# Patient Record
Sex: FEMALE | Race: WHITE | NOT HISPANIC OR LATINO | Employment: OTHER | URBAN - METROPOLITAN AREA
[De-identification: names, ages, dates, MRNs, and addresses within clinical notes are randomized per-mention and may not be internally consistent; named-entity substitution may affect disease eponyms.]

---

## 2017-10-16 ENCOUNTER — HOSPITAL ENCOUNTER (OUTPATIENT)
Dept: RADIOLOGY | Facility: HOSPITAL | Age: 67
Discharge: HOME | End: 2017-10-16
Attending: NURSE PRACTITIONER
Payer: MEDICARE

## 2017-10-16 ENCOUNTER — OFFICE VISIT (OUTPATIENT)
Dept: FAMILY MEDICINE | Facility: CLINIC | Age: 67
End: 2017-10-16
Payer: MEDICARE

## 2017-10-16 VITALS
TEMPERATURE: 100 F | HEART RATE: 84 BPM | DIASTOLIC BLOOD PRESSURE: 80 MMHG | HEIGHT: 68 IN | WEIGHT: 144.63 LBS | BODY MASS INDEX: 21.92 KG/M2 | SYSTOLIC BLOOD PRESSURE: 132 MMHG | OXYGEN SATURATION: 97 %

## 2017-10-16 DIAGNOSIS — R05.9 COUGH: ICD-10-CM

## 2017-10-16 DIAGNOSIS — R68.89 FLU-LIKE SYMPTOMS: Primary | ICD-10-CM

## 2017-10-16 LAB
FLUAV AG SPEC QL IA: NEGATIVE
FLUBV AG SPEC QL IA: NEGATIVE
SPECIMEN SOURCE: NORMAL

## 2017-10-16 PROCEDURE — 99999 PR PBB SHADOW E&M-EST. PATIENT-LVL III: CPT | Mod: PBBFAC,,, | Performed by: NURSE PRACTITIONER

## 2017-10-16 PROCEDURE — 87400 INFLUENZA A/B EACH AG IA: CPT | Mod: 59,PO

## 2017-10-16 PROCEDURE — 99213 OFFICE O/P EST LOW 20 MIN: CPT | Mod: PBBFAC,PO | Performed by: NURSE PRACTITIONER

## 2017-10-16 PROCEDURE — 99213 OFFICE O/P EST LOW 20 MIN: CPT | Mod: S$PBB,,, | Performed by: NURSE PRACTITIONER

## 2017-10-16 PROCEDURE — 71020 XR CHEST PA AND LATERAL: CPT | Mod: 26,,, | Performed by: RADIOLOGY

## 2017-10-16 PROCEDURE — 71020 XR CHEST PA AND LATERAL: CPT | Mod: TC,PO

## 2017-10-16 RX ORDER — KETOROLAC TROMETHAMINE 10 MG/1
TABLET, FILM COATED ORAL
COMMUNITY
Start: 2017-10-10

## 2017-10-16 RX ORDER — METHYLPREDNISOLONE 4 MG/1
TABLET ORAL
Qty: 1 PACKAGE | Refills: 0 | Status: SHIPPED | OUTPATIENT
Start: 2017-10-16 | End: 2017-11-06

## 2017-10-16 RX ORDER — LEVOCETIRIZINE DIHYDROCHLORIDE 5 MG/1
TABLET, FILM COATED ORAL
COMMUNITY
Start: 2017-07-19

## 2017-10-16 RX ORDER — GUAIFENESIN 1200 MG/1
1 TABLET, EXTENDED RELEASE ORAL 2 TIMES DAILY
Qty: 20 TABLET | Refills: 0 | COMMUNITY
Start: 2017-10-16

## 2017-10-16 RX ORDER — DOXYCYCLINE HYCLATE 100 MG
100 TABLET ORAL 2 TIMES DAILY
Qty: 20 TABLET | Refills: 0 | Status: SHIPPED | OUTPATIENT
Start: 2017-10-16

## 2017-10-16 RX ORDER — ALBUTEROL SULFATE 90 UG/1
2 AEROSOL, METERED RESPIRATORY (INHALATION) EVERY 6 HOURS PRN
Qty: 18 G | Refills: 0 | Status: SHIPPED | OUTPATIENT
Start: 2017-10-16

## 2017-10-16 NOTE — PROGRESS NOTES
Subjective:       Patient ID: Barbra Chapman is a 66 y.o. female.    Chief Complaint: Cough; Chest Congestion; and Rash (arms)    2 weeks ago patient had sore throat, high fever, chest congestion, and cough. Lasted about 7 days. Started to improve. Sore throat and body aches and fever improved, cough never resolved. Patient has severe headaches about a week ago, took increase dose of tylenol, headaches resolved about 2 days later. Has had continued cough over the last week, no fever or other symptoms.4 days ago patient noticed an itching rash 4 days ago on both forearms. She has used hydrocortisone cream. No rash anywhere else. Yesterday patient ran fever 100.6, fatigue and weakness. She has been taking coricidin, flonase, and xyzal.     Hepatitis C Screening due on 1950  TETANUS VACCINE due on 12/16/1967  Mammogram due on 12/16/1989  DEXA SCAN due on 12/16/1989  Colonoscopy due on 12/16/1999  Zoster Vaccine due on 12/16/2009  Pneumococcal (65+)(1 of 2 - PCV13) due on 12/16/2014  Influenza Vaccine due on 08/01/2017    Past Medical History:  Past Medical History:  No date: Sinusitis  Past Surgical History:  No date: APPENDECTOMY  Review of patient's allergies indicates:   -- Thimerosal -- Other (See Comments)    --  Eyes were blood shot, vision decreased  No current outpatient prescriptions on file prior to visit.  No current facility-administered medications on file prior to visit.     Social History    Marital status: Single              Spouse name:                       Years of education:                 Number of children:               Occupational History    None on file    Social History Main Topics    Smoking status: Former Smoker                                                                Packs/day: 0.00      Years: 0.00        Smokeless tobacco: Never Used                        Alcohol use: No              Drug use: No              Sexual activity: Not on file          Other Topics             Concern    None on file    Social History Narrative    None on file      Review of patient's family history indicates:    Heart disease                  Mother                    Diabetes                       Mother                    Stroke                         Father                            Review of Systems   Constitutional: Positive for chills, diaphoresis, fatigue and fever.   HENT: Negative.    Respiratory: Positive for cough and chest tightness. Negative for shortness of breath and wheezing.    Cardiovascular: Negative.  Negative for chest pain, palpitations and leg swelling.   Gastrointestinal: Negative.  Negative for abdominal pain, constipation, diarrhea and vomiting.   Musculoskeletal: Positive for myalgias.   Skin: Positive for rash.   Neurological: Positive for headaches. Negative for dizziness and light-headedness.       Objective:      Physical Exam   Constitutional: She is oriented to person, place, and time. No distress.   HENT:   Head: Normocephalic and atraumatic.   Eyes: Pupils are equal, round, and reactive to light.   Cardiovascular: Normal rate and regular rhythm.  Exam reveals no friction rub.    No murmur heard.  Pulmonary/Chest: She has wheezes. She has rhonchi.   Abdominal: Soft. Bowel sounds are normal. She exhibits no distension. There is no tenderness.   Musculoskeletal: She exhibits no edema.   Neurological: She is alert and oriented to person, place, and time.   Skin: Rash (macular rash both forearms) noted. She is not diaphoretic.   Psychiatric: She has a normal mood and affect. Her behavior is normal.   Vitals reviewed.      Assessment:       1. Flu-like symptoms    2. Cough        Plan:       1. Flu-like symptoms    - Influenza antigen Nasal Swab    2. Cough  Follow up if not resolving. Rest and increase fluids.   - X-Ray Chest PA And Lateral; Future  - methylPREDNISolone (MEDROL DOSEPACK) 4 mg tablet; use as directed  Dispense: 1 Package; Refill: 0  - doxycycline  (VIBRA-TABS) 100 MG tablet; Take 1 tablet (100 mg total) by mouth 2 (two) times daily.  Dispense: 20 tablet; Refill: 0  - albuterol 90 mcg/actuation inhaler; Inhale 2 puffs into the lungs every 6 (six) hours as needed.  Dispense: 18 g; Refill: 0  - guaifenesin (MUCINEX) 1,200 mg Ta12; Take 1 tablet by mouth 2 (two) times daily.  Dispense: 20 tablet; Refill: 0

## 2017-10-18 ENCOUNTER — TELEPHONE (OUTPATIENT)
Dept: FAMILY MEDICINE | Facility: CLINIC | Age: 67
End: 2017-10-18

## 2017-10-18 DIAGNOSIS — J84.10 PULMONARY FIBROSIS: Primary | ICD-10-CM

## 2017-10-18 NOTE — TELEPHONE ENCOUNTER
----- Message from Bria Durham sent at 10/18/2017  8:44 AM CDT -----  Contact: self  Patient is requesting the results from her chest x-ray.  Please call [phone] (home).  Thank you!

## 2017-11-16 ENCOUNTER — HOSPITAL ENCOUNTER (OUTPATIENT)
Dept: RADIOLOGY | Facility: HOSPITAL | Age: 67
Discharge: HOME | End: 2017-11-16
Attending: NURSE PRACTITIONER
Payer: MEDICARE

## 2017-11-16 DIAGNOSIS — J84.10 PULMONARY FIBROSIS: ICD-10-CM

## 2017-11-16 DIAGNOSIS — J84.10 PULMONARY FIBROSIS: Primary | ICD-10-CM

## 2017-11-16 PROCEDURE — 71020 XR CHEST PA AND LATERAL: CPT | Mod: 26,,, | Performed by: RADIOLOGY

## 2017-11-16 PROCEDURE — 71020 XR CHEST PA AND LATERAL: CPT | Mod: TC,PO

## 2019-09-21 ENCOUNTER — OFFICE VISIT (OUTPATIENT)
Dept: URGENT CARE | Facility: CLINIC | Age: 69
End: 2019-09-21
Payer: MEDICARE

## 2019-09-21 VITALS
HEIGHT: 68 IN | OXYGEN SATURATION: 99 % | DIASTOLIC BLOOD PRESSURE: 85 MMHG | SYSTOLIC BLOOD PRESSURE: 148 MMHG | BODY MASS INDEX: 21.82 KG/M2 | HEART RATE: 72 BPM | RESPIRATION RATE: 16 BRPM | TEMPERATURE: 98 F | WEIGHT: 144 LBS

## 2019-09-21 DIAGNOSIS — S62.396A OTHER FRACTURE OF FIFTH METACARPAL BONE, RIGHT HAND, INITIAL ENCOUNTER FOR CLOSED FRACTURE: Primary | ICD-10-CM

## 2019-09-21 DIAGNOSIS — S69.91XA INJURY OF RIGHT HAND, INITIAL ENCOUNTER: ICD-10-CM

## 2019-09-21 DIAGNOSIS — T07.XXXA ABRASIONS OF MULTIPLE SITES: ICD-10-CM

## 2019-09-21 PROCEDURE — 99214 OFFICE O/P EST MOD 30 MIN: CPT | Mod: 25,S$GLB,, | Performed by: PHYSICIAN ASSISTANT

## 2019-09-21 PROCEDURE — 73130 X-RAY EXAM OF HAND: CPT | Mod: RT,S$GLB,, | Performed by: RADIOLOGY

## 2019-09-21 PROCEDURE — 99214 PR OFFICE/OUTPT VISIT, EST, LEVL IV, 30-39 MIN: ICD-10-PCS | Mod: 25,S$GLB,, | Performed by: PHYSICIAN ASSISTANT

## 2019-09-21 PROCEDURE — 29125 PR APPLY FOREARM SPLINT,STATIC: ICD-10-PCS | Mod: RT,S$GLB,, | Performed by: PHYSICIAN ASSISTANT

## 2019-09-21 PROCEDURE — 29125 APPL SHORT ARM SPLINT STATIC: CPT | Mod: RT,S$GLB,, | Performed by: PHYSICIAN ASSISTANT

## 2019-09-21 PROCEDURE — 73130 XR HAND COMPLETE 3 VIEW RIGHT: ICD-10-PCS | Mod: RT,S$GLB,, | Performed by: RADIOLOGY

## 2019-09-21 RX ORDER — MUPIROCIN 20 MG/G
OINTMENT TOPICAL 3 TIMES DAILY
Qty: 22 G | Refills: 1 | Status: SHIPPED | OUTPATIENT
Start: 2019-09-21 | End: 2019-10-01

## 2019-09-21 NOTE — PATIENT INSTRUCTIONS
Closed Hand Fracture (Adult)  You have a fracture, or broken bone, in your hand. This may be a small crack or chip in the bone. Or it may be a major break with the broken parts pushed out of place. A closed fracture means that the broken bone has not gone through the skin. A hand fracture is treated with a splint or cast. It usually takes 4 to 6 weeks to heal. Severe injuries may require surgery.     Home care  · Keep your arm elevated to reduce pain and swelling. When sitting or lying down, elevate your arm above the level of your heart. You can do this by placing your arm on a pillow that rests on your chest or on a pillow at your side. This is most important during the first 48 hours after injury.  · Apply an ice pack over the injured area for no more than 15 to 20 minutes. Do this every 1 to 2 hours for the first 24 to 48 hours. Continue with ice packs as needed to ease pain and swelling. To make an ice pack, put ice cubes in a plastic bag that seals at the top. Wrap the bag in a clean, thin towel or cloth. Never put ice or an ice pack directly on the skin. You can place the ice pack inside the sling and directly over the cast or splint. As the ice melts, be careful that the cast or splint doesnt get wet.  · Keep the cast or splint completely dry at all times. Bathe with your cast or splint out of the water, protected with 2 large plastic bags. Place 1 bag outside the other. Tape each bag with duct tape at the top end. If a fiberglass cast or splint gets wet, dry it with a hair dryer on a cool setting.  · You may use over-the-counter pain medicine to control pain, unless another pain medicine was prescribed. If you have chronic liver or kidney disease or ever had a stomach ulcer or GI bleeding, talk with your provider beforeusing these medicines.  Follow-up care  Follow up with your healthcare provider within 1 week, or as advised. This is to be sure the bone is healing properly. If you were given a splint,  it may be changed to a cast at your follow-up visit.  If X-rays were taken, you will be told of any new findings that may affect your care.  When to seek medical advice  Call your healthcare provider right away if any of these occur:  · The plaster cast or splint becomes wet or soft  · The fiberglass cast or splint stays wet for more than 24 hours  · The cast has a bad smell  · The plaster cast or splint becomes loose  · There is increased tightness or pain under the cast or splint  · The fingers on your injured hand become swollen, cold, blue, numb, or tingly  Date Last Reviewed: 12/3/2015  © 4426-4464 [employer]. [address] [place] [address], [place] [address]. All rights reserved. This information is not intended as a substitute for professional medical care. Always follow your healthcare professional's instructions.
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
## 2019-09-23 ENCOUNTER — OFFICE VISIT (OUTPATIENT)
Dept: ORTHOPEDICS | Facility: CLINIC | Age: 69
End: 2019-09-23
Payer: MEDICARE

## 2019-09-23 VITALS — HEIGHT: 68 IN | WEIGHT: 144 LBS | BODY MASS INDEX: 21.82 KG/M2

## 2019-09-23 DIAGNOSIS — S62.336A CLOSED DISPLACED FRACTURE OF NECK OF FIFTH METACARPAL BONE OF RIGHT HAND, INITIAL ENCOUNTER: Primary | ICD-10-CM

## 2019-09-23 DIAGNOSIS — S62.345A CLOSED NONDISPLACED FRACTURE OF BASE OF FOURTH METACARPAL BONE OF LEFT HAND, INITIAL ENCOUNTER: ICD-10-CM

## 2019-09-23 PROCEDURE — 99212 OFFICE O/P EST SF 10 MIN: CPT | Mod: PBBFAC,PN,25 | Performed by: ORTHOPAEDIC SURGERY

## 2019-09-23 PROCEDURE — 26600 PR CLOSED RX METACARPAL FX: ICD-10-PCS | Mod: S$PBB,RT,, | Performed by: ORTHOPAEDIC SURGERY

## 2019-09-23 PROCEDURE — 26600 TREAT METACARPAL FRACTURE: CPT | Mod: F8,PBBFAC,PN | Performed by: ORTHOPAEDIC SURGERY

## 2019-09-23 PROCEDURE — 99204 PR OFFICE/OUTPT VISIT, NEW, LEVL IV, 45-59 MIN: ICD-10-PCS | Mod: 57,S$PBB,, | Performed by: ORTHOPAEDIC SURGERY

## 2019-09-23 PROCEDURE — 99999 PR PBB SHADOW E&M-EST. PATIENT-LVL II: ICD-10-PCS | Mod: PBBFAC,,, | Performed by: ORTHOPAEDIC SURGERY

## 2019-09-23 PROCEDURE — 99204 OFFICE O/P NEW MOD 45 MIN: CPT | Mod: 57,S$PBB,, | Performed by: ORTHOPAEDIC SURGERY

## 2019-09-23 PROCEDURE — 99999 PR PBB SHADOW E&M-EST. PATIENT-LVL II: CPT | Mod: PBBFAC,,, | Performed by: ORTHOPAEDIC SURGERY

## 2019-09-23 PROCEDURE — 26600 TREAT METACARPAL FRACTURE: CPT | Mod: S$PBB,RT,, | Performed by: ORTHOPAEDIC SURGERY

## 2019-09-23 NOTE — PATIENT INSTRUCTIONS
Fiberglass Cast Care    It may take up to 2 hours for the fiberglass to get completely hard. Dont put any weight on the cast during that time or it may break.  To prevent swelling under the cast, do the following for the first 2 days (48 hours):  · If the cast is on your arm: Keep it in a sling or raised to shoulder level when you are sitting or standing. Rest it on your chest or on a pillow at your side when lying down. Keep the cast above the level of your heart.  · If the cast is on your leg: Keep it propped up above the level of your hip when you are sitting or lying down. Sleep with the cast raised on a pillow. Avoid crutch walking as much as possible during this time.  Keep the cast completely dry at all times. Bathe with your cast well out of the water. Protect it with a large plastic bag kept in place with rubber bands. If your cast does get wet, use a hair dryer to warm the cast and speed up the drying process. A wet cast may cause skin problems.  Dont put creams or objects under the cast if you have itching.  Follow-up care  Follow up with your healthcare provider, or as advised.  When to seek medical advice  Call your healthcare provider right away if any of these occur:  · The cast cracks  · The cast and padding get wet and stay wet for more than a day (24 hours)  · Bad odor from the cast or wound fluid stains the cast  · Tightness or pressure under the cast gets worse  · Fingers or toes become swollen, cold, blue, numb, or tingly  · You cant move your toes or fingers  · Pain under the cast gets worse or you feel a burning sensation  · Skin around the cast becomes red  · Fever of 100.4ºF (38ºC) or higher, or as directed by your healthcare provider   Date Last Reviewed: 2/1/2017  © 6605-9111 [employer]. [address] [place] [address], [place] [address]. All rights reserved. This information is not intended as a substitute for professional medical care. Always follow your healthcare  professional's instructions.
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
## 2019-09-23 NOTE — LETTER
September 23, 2019      DEREK Menjivar  [address] [facility] [address]           [facility] [place] [address] [place] [id number]  Phone: [phone]          Patient: Barbra Chapman   MR Number: [id number]   YOB: 1950   Date of Visit: 9/23/2019       Dear Xi Carter:    Thank you for referring Barbra Chapman to me for evaluation. Attached you will find relevant portions of my assessment and plan of care.    If you have questions, please do not hesitate to call me. I look forward to following Barbra Chapman along with you.    Sincerely,    Eddie Patel II, MD    Enclosure  CC:  No Recipients    If you would like to receive this communication electronically, please contact [email or web address] or [phone] to request more information on [employer] Link access.    For providers and/or their staff who would like to refer a patient to [facility], please contact us through our one-stop-shop provider referral line, Miranda Horta, at [phone].    If you feel you have received this communication in error or would no longer like to receive these types of communications, please e-mail [email or web address]

## 2019-09-24 ENCOUNTER — TELEPHONE (OUTPATIENT)
Dept: ORTHOPEDICS | Facility: CLINIC | Age: 69
End: 2019-09-24

## 2019-09-24 ENCOUNTER — TELEPHONE (OUTPATIENT)
Dept: URGENT CARE | Facility: CLINIC | Age: 69
End: 2019-09-24

## 2019-09-24 NOTE — TELEPHONE ENCOUNTER
----- Message from Earl Mejia sent at 9/24/2019 11:15 AM CDT -----  Contact: pt   Pt would like a call back regarding having questions about her visit on yesterday  Would like to know when she can start exercising again Pt insisted in message being sent        Pt can be reached at  [phone]

## 2019-09-24 NOTE — TELEPHONE ENCOUNTER
Arnaldo- Pt states that she saw an orthopedic dr yesterday in [place].  States that he confirmed she does not need surgery.  They put her in a fiberglass cast.  She has an appointment in [place] in 3 weeks. They are going to remove the cast and take xrays at that visit.

## 2019-10-03 ENCOUNTER — TELEPHONE (OUTPATIENT)
Dept: ORTHOPEDICS | Facility: CLINIC | Age: 69
End: 2019-10-03

## 2019-10-03 NOTE — TELEPHONE ENCOUNTER
Pt rescheduled from 10/14/19 10:20 AM to 10/14/19 1:00 PM due to provider will not be in clinic.  Pt stated she will need to check with her sister for transportation to make sure she can attend appointment time.  Pt stated she will call me back to let me know if we need to reschedule to appointment.

## 2019-10-14 ENCOUNTER — OFFICE VISIT (OUTPATIENT)
Dept: ORTHOPEDICS | Facility: CLINIC | Age: 69
End: 2019-10-14
Payer: MEDICARE

## 2019-10-14 ENCOUNTER — HOSPITAL ENCOUNTER (OUTPATIENT)
Dept: RADIOLOGY | Facility: HOSPITAL | Age: 69
Discharge: HOME | End: 2019-10-14
Attending: ORTHOPAEDIC SURGERY
Payer: MEDICARE

## 2019-10-14 ENCOUNTER — DOCUMENTATION ONLY (OUTPATIENT)
Dept: ORTHOPEDICS | Facility: CLINIC | Age: 69
End: 2019-10-14

## 2019-10-14 VITALS
HEART RATE: 61 BPM | HEIGHT: 68 IN | DIASTOLIC BLOOD PRESSURE: 71 MMHG | SYSTOLIC BLOOD PRESSURE: 143 MMHG | WEIGHT: 140 LBS | BODY MASS INDEX: 21.22 KG/M2

## 2019-10-14 DIAGNOSIS — S62.336A CLOSED DISPLACED FRACTURE OF NECK OF FIFTH METACARPAL BONE OF RIGHT HAND, INITIAL ENCOUNTER: Primary | ICD-10-CM

## 2019-10-14 DIAGNOSIS — S62.336A CLOSED DISPLACED FRACTURE OF NECK OF FIFTH METACARPAL BONE OF RIGHT HAND, INITIAL ENCOUNTER: ICD-10-CM

## 2019-10-14 DIAGNOSIS — S62.344A CLOSED NONDISPLACED FRACTURE OF BASE OF FOURTH METACARPAL BONE OF RIGHT HAND, INITIAL ENCOUNTER: ICD-10-CM

## 2019-10-14 PROCEDURE — 99203 PR OFFICE/OUTPT VISIT, NEW, LEVL III, 30-44 MIN: ICD-10-PCS | Mod: S$PBB,,, | Performed by: ORTHOPAEDIC SURGERY

## 2019-10-14 PROCEDURE — 99213 OFFICE O/P EST LOW 20 MIN: CPT | Mod: PBBFAC,25,PN | Performed by: ORTHOPAEDIC SURGERY

## 2019-10-14 PROCEDURE — 99203 OFFICE O/P NEW LOW 30 MIN: CPT | Mod: S$PBB,,, | Performed by: ORTHOPAEDIC SURGERY

## 2019-10-14 PROCEDURE — 73130 XR HAND COMPLETE 3 VIEW RIGHT: ICD-10-PCS | Mod: 26,RT,, | Performed by: RADIOLOGY

## 2019-10-14 PROCEDURE — 73130 X-RAY EXAM OF HAND: CPT | Mod: TC,PO,RT

## 2019-10-14 PROCEDURE — 99999 PR PBB SHADOW E&M-EST. PATIENT-LVL III: CPT | Mod: PBBFAC,,, | Performed by: ORTHOPAEDIC SURGERY

## 2019-10-14 PROCEDURE — 99999 PR PBB SHADOW E&M-EST. PATIENT-LVL III: ICD-10-PCS | Mod: PBBFAC,,, | Performed by: ORTHOPAEDIC SURGERY

## 2019-10-14 PROCEDURE — 73130 X-RAY EXAM OF HAND: CPT | Mod: 26,RT,, | Performed by: RADIOLOGY

## 2019-10-14 NOTE — PROGRESS NOTES
Dr. Mehta ordered right short arm cast to be removed. Short arm cast removed without difficulty. No skin irritation noted. Patient tolerated cast removal well.
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
## 2019-10-24 DIAGNOSIS — M79.641 RIGHT HAND PAIN: Primary | ICD-10-CM

## 2019-10-28 ENCOUNTER — HOSPITAL ENCOUNTER (OUTPATIENT)
Dept: RADIOLOGY | Facility: HOSPITAL | Age: 69
Discharge: HOME | End: 2019-10-28
Attending: ORTHOPAEDIC SURGERY
Payer: MEDICARE

## 2019-10-28 ENCOUNTER — OFFICE VISIT (OUTPATIENT)
Dept: ORTHOPEDICS | Facility: CLINIC | Age: 69
End: 2019-10-28
Payer: MEDICARE

## 2019-10-28 VITALS
HEART RATE: 76 BPM | SYSTOLIC BLOOD PRESSURE: 125 MMHG | WEIGHT: 140 LBS | HEIGHT: 68 IN | BODY MASS INDEX: 21.22 KG/M2 | DIASTOLIC BLOOD PRESSURE: 79 MMHG

## 2019-10-28 DIAGNOSIS — S62.344D CLOSED NONDISPLACED FRACTURE OF BASE OF FOURTH METACARPAL BONE OF RIGHT HAND WITH ROUTINE HEALING, SUBSEQUENT ENCOUNTER: ICD-10-CM

## 2019-10-28 DIAGNOSIS — S62.336D CLOSED DISPLACED FRACTURE OF NECK OF FIFTH METACARPAL BONE OF RIGHT HAND WITH ROUTINE HEALING, SUBSEQUENT ENCOUNTER: Primary | ICD-10-CM

## 2019-10-28 DIAGNOSIS — M79.641 RIGHT HAND PAIN: ICD-10-CM

## 2019-10-28 PROCEDURE — 99024 POSTOP FOLLOW-UP VISIT: CPT | Mod: POP,,, | Performed by: ORTHOPAEDIC SURGERY

## 2019-10-28 PROCEDURE — 73130 X-RAY EXAM OF HAND: CPT | Mod: 26,RT,, | Performed by: RADIOLOGY

## 2019-10-28 PROCEDURE — 99213 OFFICE O/P EST LOW 20 MIN: CPT | Mod: PBBFAC,25,PN | Performed by: ORTHOPAEDIC SURGERY

## 2019-10-28 PROCEDURE — 73130 X-RAY EXAM OF HAND: CPT | Mod: TC,PO,RT

## 2019-10-28 PROCEDURE — 99024 PR POST-OP FOLLOW-UP VISIT: ICD-10-PCS | Mod: POP,,, | Performed by: ORTHOPAEDIC SURGERY

## 2019-10-28 PROCEDURE — 99999 PR PBB SHADOW E&M-EST. PATIENT-LVL III: CPT | Mod: PBBFAC,,, | Performed by: ORTHOPAEDIC SURGERY

## 2019-10-28 PROCEDURE — 73130 XR HAND COMPLETE 3 VIEW RIGHT: ICD-10-PCS | Mod: 26,RT,, | Performed by: RADIOLOGY

## 2019-10-28 PROCEDURE — 99999 PR PBB SHADOW E&M-EST. PATIENT-LVL III: ICD-10-PCS | Mod: PBBFAC,,, | Performed by: ORTHOPAEDIC SURGERY
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
## 2019-11-08 DIAGNOSIS — S62.336D CLOSED DISPLACED FRACTURE OF NECK OF FIFTH METACARPAL BONE OF RIGHT HAND WITH ROUTINE HEALING, SUBSEQUENT ENCOUNTER: Primary | ICD-10-CM

## 2019-11-11 ENCOUNTER — HOSPITAL ENCOUNTER (OUTPATIENT)
Dept: RADIOLOGY | Facility: HOSPITAL | Age: 69
Discharge: HOME | End: 2019-11-11
Attending: ORTHOPAEDIC SURGERY
Payer: MEDICARE

## 2019-11-11 ENCOUNTER — OFFICE VISIT (OUTPATIENT)
Dept: ORTHOPEDICS | Facility: CLINIC | Age: 69
End: 2019-11-11
Payer: MEDICARE

## 2019-11-11 VITALS
DIASTOLIC BLOOD PRESSURE: 79 MMHG | WEIGHT: 140 LBS | HEIGHT: 68 IN | HEART RATE: 74 BPM | SYSTOLIC BLOOD PRESSURE: 111 MMHG | BODY MASS INDEX: 21.22 KG/M2

## 2019-11-11 DIAGNOSIS — S62.344D CLOSED NONDISPLACED FRACTURE OF BASE OF FOURTH METACARPAL BONE OF RIGHT HAND WITH ROUTINE HEALING, SUBSEQUENT ENCOUNTER: ICD-10-CM

## 2019-11-11 DIAGNOSIS — S62.336D CLOSED DISPLACED FRACTURE OF NECK OF FIFTH METACARPAL BONE OF RIGHT HAND WITH ROUTINE HEALING, SUBSEQUENT ENCOUNTER: Primary | ICD-10-CM

## 2019-11-11 DIAGNOSIS — S62.336D CLOSED DISPLACED FRACTURE OF NECK OF FIFTH METACARPAL BONE OF RIGHT HAND WITH ROUTINE HEALING, SUBSEQUENT ENCOUNTER: ICD-10-CM

## 2019-11-11 PROCEDURE — 99999 PR PBB SHADOW E&M-EST. PATIENT-LVL III: ICD-10-PCS | Mod: PBBFAC,,, | Performed by: ORTHOPAEDIC SURGERY

## 2019-11-11 PROCEDURE — 73130 XR HAND COMPLETE 3 VIEW RIGHT: ICD-10-PCS | Mod: 26,RT,, | Performed by: RADIOLOGY

## 2019-11-11 PROCEDURE — 73130 X-RAY EXAM OF HAND: CPT | Mod: 26,RT,, | Performed by: RADIOLOGY

## 2019-11-11 PROCEDURE — 99024 POSTOP FOLLOW-UP VISIT: CPT | Mod: POP,,, | Performed by: ORTHOPAEDIC SURGERY

## 2019-11-11 PROCEDURE — 99999 PR PBB SHADOW E&M-EST. PATIENT-LVL III: CPT | Mod: PBBFAC,,, | Performed by: ORTHOPAEDIC SURGERY

## 2019-11-11 PROCEDURE — 99024 PR POST-OP FOLLOW-UP VISIT: ICD-10-PCS | Mod: POP,,, | Performed by: ORTHOPAEDIC SURGERY

## 2019-11-11 PROCEDURE — 73130 X-RAY EXAM OF HAND: CPT | Mod: TC,PO,RT

## 2019-11-11 PROCEDURE — 99213 OFFICE O/P EST LOW 20 MIN: CPT | Mod: PBBFAC,25,PN | Performed by: ORTHOPAEDIC SURGERY

## 2019-11-11 NOTE — PROGRESS NOTES
Ms Hadley returns to clinic today.  She has a history of right 4th metacarpal base fracture and 5th metacarpal neck fracture. She is overall doing well.  She is working on motion.  She still has stiffness.    Physical exam:  Examination the right hand reveals there are no skin changes.  There is no edema.  She does have changes consistent with arthritis.  Flexion and extension of the fingers reveals that she is able to flex to within 2 cm of the distal palmar crease. She can fully extend the fingers.  Sensation is grossly intact and capillary refill less than 2 sec    Radiology:  X-rays of the right hand reveal that there are fractures of the neck of the 5th metacarpal and the base of the 4th metacarpal.  These fractures remain well aligned and are now healed    Assessment:  Right 4th metacarpal base and 5th metacarpal neck fracture    Plan:    1.  I will set her up with occupational therapy to work on range of motion    2.  She is allowed to increase weight-bearing as tolerated    3.  Follow up with me in 4 weeks for repeat evaluation

## 2019-11-12 ENCOUNTER — CLINICAL SUPPORT (OUTPATIENT)
Dept: REHABILITATION | Facility: HOSPITAL | Age: 69
End: 2019-11-12
Attending: ORTHOPAEDIC SURGERY
Payer: MEDICARE

## 2019-11-12 DIAGNOSIS — M79.89 PAIN AND SWELLING OF RIGHT UPPER EXTREMITY: ICD-10-CM

## 2019-11-12 DIAGNOSIS — S62.344D CLOSED NONDISPLACED FRACTURE OF BASE OF FOURTH METACARPAL BONE OF RIGHT HAND WITH ROUTINE HEALING, SUBSEQUENT ENCOUNTER: Primary | ICD-10-CM

## 2019-11-12 DIAGNOSIS — M79.601 PAIN AND SWELLING OF RIGHT UPPER EXTREMITY: ICD-10-CM

## 2019-11-12 DIAGNOSIS — M25.641 STIFFNESS OF FINGER JOINT OF RIGHT HAND: ICD-10-CM

## 2019-11-12 DIAGNOSIS — S62.336D CLOSED DISPLACED FRACTURE OF NECK OF FIFTH METACARPAL BONE OF RIGHT HAND WITH ROUTINE HEALING, SUBSEQUENT ENCOUNTER: ICD-10-CM

## 2019-11-12 PROCEDURE — 97110 THERAPEUTIC EXERCISES: CPT | Mod: PO

## 2019-11-12 PROCEDURE — 97140 MANUAL THERAPY 1/> REGIONS: CPT | Mod: PO

## 2019-11-12 PROCEDURE — 97166 OT EVAL MOD COMPLEX 45 MIN: CPT | Mod: PO
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
## 2019-11-15 ENCOUNTER — CLINICAL SUPPORT (OUTPATIENT)
Dept: REHABILITATION | Facility: HOSPITAL | Age: 69
End: 2019-11-15
Attending: ORTHOPAEDIC SURGERY
Payer: MEDICARE

## 2019-11-15 DIAGNOSIS — S62.344D CLOSED NONDISPLACED FRACTURE OF BASE OF FOURTH METACARPAL BONE OF RIGHT HAND WITH ROUTINE HEALING, SUBSEQUENT ENCOUNTER: ICD-10-CM

## 2019-11-15 DIAGNOSIS — M79.601 PAIN AND SWELLING OF RIGHT UPPER EXTREMITY: ICD-10-CM

## 2019-11-15 DIAGNOSIS — S62.336D CLOSED DISPLACED FRACTURE OF NECK OF FIFTH METACARPAL BONE OF RIGHT HAND WITH ROUTINE HEALING, SUBSEQUENT ENCOUNTER: ICD-10-CM

## 2019-11-15 DIAGNOSIS — M25.641 STIFFNESS OF FINGER JOINT OF RIGHT HAND: Primary | ICD-10-CM

## 2019-11-15 DIAGNOSIS — M79.89 PAIN AND SWELLING OF RIGHT UPPER EXTREMITY: ICD-10-CM

## 2019-11-15 PROCEDURE — 97110 THERAPEUTIC EXERCISES: CPT | Mod: PO

## 2019-11-15 PROCEDURE — 97140 MANUAL THERAPY 1/> REGIONS: CPT | Mod: PO

## 2019-11-15 NOTE — PROGRESS NOTES
Occupational Therapy Daily Treatment Note     Name: Barbra Chapman  Clinic Number: [id number]    Therapy Diagnosis:   Encounter Diagnoses   Name Primary?    Stiffness of finger joint of right hand Yes    Pain and swelling of right upper extremity     Closed nondisplaced fracture of base of fourth metacarpal bone of right hand with routine healing, subsequent encounter     Closed displaced fracture of neck of fifth metacarpal bone of right hand with routine healing, subsequent encounter      Physician: Rosendo Mehta MD    Physician orders:  Note     Please begin therapy to the right hand.  Include range of motion and strengthening activities     Frequency:  3 times per week     Duration:  4 weeks     Diagnosis:  Right 4th metacarpal base and 5th metacarpal neck fractures          Visit Date: 11/18/2019  Medical Diagnosis:   S62.336D (ICD-10-CM) - Closed displaced fracture of neck of fifth metacarpal bone of right hand with routine healing, subsequent encounter   S62.344D (ICD-10-CM) - Closed nondisplaced fracture of base of fourth metacarpal bone of right hand with routine healing, subsequent encounter          Evaluation Date: 11/12/2019  Insurance Authorization period Expiration   Cert end date: 2/10/2020  Plan of Care Expiration Period: 12/10/19  Next Re-assessment: by: In 4 weeks: 12/10/19 and/or 10th visit  Date of Return to MD: 12/9/19     Visit # / Visits Authortized: 3 / 12  Time In: 1351   Time Out: 1451  Total Billable Time: 40 minutes     Precautions: Standard and progress with strengthening  Surgery: N/A, closed tx with cast                                S/P: approx 8 weeks      Subjective     Pt reports: She has a lot of trouble getting hand moving each morning, but she does feel like she is progressing.  she was compliant with HEP.   Response to previous treatment: Good.  Functional change: tolerating modified push ups.    Pain:  Functional Pain Scale Rating 0-10:   3/10 on average  0/10  at best  5/10 at worst  Location: R hand  Description: aches  Aggravating Factors: lift carry, attempts to make full fist and do push ups.   Easing Factors: rest/elevation.    Objective   LM AROM (Lag with ext) 4th PIP = -3 (+13), 5th -10 (+5) degrees.     AROM Hand: Tip to palm/DPC digits 1-5 (in cm)  R 4th and 5th not intact : 4th = -0.5. (+1.5 cm) , 5th -0.5 (+0.5 cm),      Edema:            Circumferential (in cm) L R           MPs 20.3 20.8 (-0.0)               TREATMENT  Barbra received the following supervised modalities: after being cleared for contradictions for 6 min with hand propped in max ext 3 min then max composite fist x 3 min for low load prolonged stretch.     Barbra received the following manual therapy techniques for 8 min utes minutes: retrograde/STM R hand.    Barbra received therapeutic exercises for 15 min includin min PROM ex with hand propped in max ext 3 min then max composite fist x 3 min for low load prolonged stretch and moist heat to facilitate.  Tenodesis pattern x25   Towel scrunches  Fingerlifts x25  Gentle PROM digit flexion and ext x10 each    Home Exercises and Education Provided     Education provided: Isotoner glove recommendation, nightime wear nomi    Written Home Exercises Provided:  Patient instructed to cont prior HEP and begin putty ex as tolerated.    Exercises were reviewed and Barbra was able to demonstrate them prior to the end of the session.  Barbra demonstrated good  understanding of the education provided.   .   See EMR under Media for exercises provided today and/or prior visit..        Assessment     Pt would continue to benefit from skilled OT. Pt with excellent/early AROM gains with flexion and extension R hand.    - Progress towards goals: STG #1 has been met.    Barbra is progressing well towards her goals and there are no updates to goals at this time. Pt prognosis continues with good rehab potential.     Pt will continue to benefit from skilled  outpatient occupational therapy to address the deficits listed in the problem list on initial evaluation in order to maximize pt's level of independence in the home and community.     Anticipated barriers to occupational therapy: None    Pt's spiritual, cultural and educational needs considered and pt agreeable to plan of care and goals.    Goals:  Short Term Goals: (to be met by 12/10/19, or 10th visit) unless otherwise noted below.  1. Pt will be independent with HEP in 2 visits. (Met)  2. Pt will report decreased pain to a 5/10 at worst in R hand with ADL/IADLs in order to increase function/use of UE.   3. Pt will increase AROM ext of 4th and 4thh PIP jts to neutral (0 degrees) to increase function for ADL/IADL.  4. Pt will increase AROM R 4th and 5th tip to palm to intact / 0 cm from DPC to increase function for ADL/IADL.  5  Pt will demo decreased edema R hand MCPS to WNL in order to speed recovery.     Long Term Goals: (by discharge)  1. Pt will report decreased pain to 1-2/10 with ADLs to allow for increased function/use of UE.   2. Pt will exhibit grossly WNL AROM of R hand to allow for Independent use of for all ADL/IADL tasks.  3. Pt will exhibit WFL  strength to allow a firm grasp during ADL/IADL (cooking utensils, tool use, carrying groceries, steering wheel, etc.)  4. Pt will report no difficulty with all Quick DASH assessment items.  5. Pt will return to PLOF with all ADL/IADL, leisure and job tasks.    Plan   Outpatient Occupational Therapy 3 times weekly through current poc expiration date 12/10/19, in order to to decrease pain and edema, and increase A/PROM, strength, and functional use of R upper extremity.    Updates/Grading for next session: Progress with OT as tolerated      BEHZAD Salas, BROOKET

## 2019-11-15 NOTE — PROGRESS NOTES
Occupational Therapy Daily Treatment Note     Name: Barbra Chapman  Clinic Number: [id number]    Therapy Diagnosis:   Encounter Diagnoses   Name Primary?    Stiffness of finger joint of right hand Yes    Pain and swelling of right upper extremity     Closed nondisplaced fracture of base of fourth metacarpal bone of right hand with routine healing, subsequent encounter     Closed displaced fracture of neck of fifth metacarpal bone of right hand with routine healing, subsequent encounter      Physician: Rosendo Mehta MD    Physician orders:  Note     Please begin therapy to the right hand.  Include range of motion and strengthening activities     Frequency:  3 times per week     Duration:  4 weeks     Diagnosis:  Right 4th metacarpal base and 5th metacarpal neck fractures          Visit Date: 11/15/2019  Medical Diagnosis:   S62.336D (ICD-10-CM) - Closed displaced fracture of neck of fifth metacarpal bone of right hand with routine healing, subsequent encounter   S62.344D (ICD-10-CM) - Closed nondisplaced fracture of base of fourth metacarpal bone of right hand with routine healing, subsequent encounter          Evaluation Date: 11/12/2019  Insurance Authorization period Expiration   Cert end date: 2/10/2020  Plan of Care Expiration Period: 12/10/19  Next Re-assessment: by: In 4 weeks: 12/10/19 and/or 10th visit  Date of Return to MD: 12/9/19     Visit # / Visits Authortized: 2 / 12  Time In: 1545  Time Out: 1630     Total Billable Time: 30 minutes     Precautions: Standard and progress with strengthening  Surgery: N/A, closed tx with cast                                S/P: approx 8 weeks      Subjective     Pt reports: She is still having trouble limitations R hand strength (could not pull mower to start)  she was compliant with HEP.   Response to previous treatment: Good.  Functional change: tolerating modified push ups.    Pain:  Functional Pain Scale Rating 0-10:   3/10 on average  0/10 at  best  5/10 at worst  Location: R hand  Description: aches  Aggravating Factors: lift carry, attempts to make full fist and do push ups.   Easing Factors: rest/elevation.    Objective   AROM (Lag with ext) 4th PIP = -3 (+13), 5th -10 (+5) degrees.     AROM Hand: Tip to palm/DPC digits 1-5 (in cm)  R 4th and 5th not intact : 4th = -2.0 (+1.5 cm), 5th -2.0 (+2.0) cm       TREATMENT  Barbra received the following supervised modalities: after being cleared for contradictions for 6 min with hand propped in max ext 3 min then max composite fist x 3 min for low load prolonged stretch.     Barbra received the following manual therapy techniques for 8 min utes minutes: retrograde/STM R hand.    Barbra received therapeutic exercises for 15 min includin min PROM ex with hand propped in max ext 3 min then max composite fist x 3 min for low load prolonged stretch and moist heat to facilitate.  HEP putty ex,  Gentle PROM digit ext x10 each    Home Exercises and Education Provided     Education provided:   - HEP putty ex:     Written Home Exercises Provided:  Patient instructed to cont prior HEP and begin putty ex as tolerated.    Exercises were reviewed and Barbra was able to demonstrate them prior to the end of the session.  Barbra demonstrated good  understanding of the education provided.   .   See EMR under Media for exercises provided today and/or prior visit..        Assessment     Pt would continue to benefit from skilled OT. Pt with excellent/early AROM gains with flexion and extension R hand.    - Progress towards goals: STG #1 has been met.    Barbra is progressing well towards her goals and there are no updates to goals at this time. Pt prognosis continues with good rehab potential.     Pt will continue to benefit from skilled outpatient occupational therapy to address the deficits listed in the problem list on initial evaluation in order to maximize pt's level of independence in the home and community.      Anticipated barriers to occupational therapy: None    Pt's spiritual, cultural and educational needs considered and pt agreeable to plan of care and goals.    Goals:  Short Term Goals: (to be met by 12/10/19, or 10th visit) unless otherwise noted below.  1. Pt will be independent with HEP in 2 visits. (Met)  2. Pt will report decreased pain to a 5/10 at worst in R hand with ADL/IADLs in order to increase function/use of UE.   3. Pt will increase AROM ext of 4th and 4thh PIP jts to neutral (0 degrees) to increase function for ADL/IADL.  4. Pt will increase AROM R 4th and 5th tip to palm to intact / 0 cm from DPC to increase function for ADL/IADL.  5  Pt will demo decreased edema R hand MCPS to WNL in order to speed recovery.     Long Term Goals: (by discharge)  1. Pt will report decreased pain to 1-2/10 with ADLs to allow for increased function/use of UE.   2. Pt will exhibit grossly WNL AROM of R hand to allow for Independent use of for all ADL/IADL tasks.  3. Pt will exhibit WFL  strength to allow a firm grasp during ADL/IADL (cooking utensils, tool use, carrying groceries, steering wheel, etc.)  4. Pt will report no difficulty with all Quick DASH assessment items.  5. Pt will return to PLOF with all ADL/IADL, leisure and job tasks.    Plan   Outpatient Occupational Therapy 3 times weekly through current poc expiration date 12/10/19, in order to to decrease pain and edema, and increase A/PROM, strength, and functional use of R upper extremity.    Updates/Grading for next session: Progress with OT as tolerated      BEHZAD Salas, BROOKET

## 2019-11-18 ENCOUNTER — CLINICAL SUPPORT (OUTPATIENT)
Dept: REHABILITATION | Facility: HOSPITAL | Age: 69
End: 2019-11-18
Attending: ORTHOPAEDIC SURGERY
Payer: MEDICARE

## 2019-11-18 DIAGNOSIS — M25.641 STIFFNESS OF FINGER JOINT OF RIGHT HAND: Primary | ICD-10-CM

## 2019-11-18 DIAGNOSIS — S62.344D CLOSED NONDISPLACED FRACTURE OF BASE OF FOURTH METACARPAL BONE OF RIGHT HAND WITH ROUTINE HEALING, SUBSEQUENT ENCOUNTER: ICD-10-CM

## 2019-11-18 DIAGNOSIS — M79.89 PAIN AND SWELLING OF RIGHT UPPER EXTREMITY: ICD-10-CM

## 2019-11-18 DIAGNOSIS — S62.336D CLOSED DISPLACED FRACTURE OF NECK OF FIFTH METACARPAL BONE OF RIGHT HAND WITH ROUTINE HEALING, SUBSEQUENT ENCOUNTER: ICD-10-CM

## 2019-11-18 DIAGNOSIS — M79.601 PAIN AND SWELLING OF RIGHT UPPER EXTREMITY: ICD-10-CM

## 2019-11-18 PROCEDURE — 97110 THERAPEUTIC EXERCISES: CPT | Mod: PO

## 2019-11-18 PROCEDURE — 97140 MANUAL THERAPY 1/> REGIONS: CPT | Mod: PO

## 2019-11-19 NOTE — PROGRESS NOTES
Occupational Therapy Daily Treatment Note     Name: Barbra Chapman  Clinic Number: [id number]    Therapy Diagnosis:   Encounter Diagnoses   Name Primary?    Stiffness of finger joint of right hand Yes    Pain and swelling of right upper extremity     Closed nondisplaced fracture of base of fourth metacarpal bone of right hand with routine healing, subsequent encounter     Closed displaced fracture of neck of fifth metacarpal bone of right hand with routine healing, subsequent encounter      Physician: Rosendo Mehta MD    Physician orders:  Note     Please begin therapy to the right hand.  Include range of motion and strengthening activities     Frequency:  3 times per week     Duration:  4 weeks     Diagnosis:  Right 4th metacarpal base and 5th metacarpal neck fractures          Visit Date: 11/20/2019  Medical Diagnosis:   S62.336D (ICD-10-CM) - Closed displaced fracture of neck of fifth metacarpal bone of right hand with routine healing, subsequent encounter   S62.344D (ICD-10-CM) - Closed nondisplaced fracture of base of fourth metacarpal bone of right hand with routine healing, subsequent encounter          Evaluation Date: 11/12/2019  Insurance Authorization period Expiration   Cert end date: 2/10/2020  Plan of Care Expiration Period: 12/10/19  Next Re-assessment: by: In 4 weeks: 12/10/19 and/or 10th visit  Date of Return to MD: 12/9/19     Visit # / Visits Authortized: 4 / 12  Time In: 1301  Time Out: 1355    Total Billable Time: 54 minutes     Precautions: Standard and progress with strengthening  Surgery: N/A, closed tx with cast                                S/P: approx 8 weeks      Subjective     Pt reports: She has a lot of trouble getting hand moving each morning, but she does feel like she is progressing.  she was compliant with HEP.   Response to previous treatment: Good.  Functional change: Can  handlebars and  lawmnmower better.    Pain:  Functional Pain Scale Rating 0-10:   3/10  on average  0/10 at best  5/10 at worst  Location: R hand  Description: aches  Aggravating Factors: lift carry, attempts to make full fist and do push ups.   Easing Factors: rest/elevation.    Objective   AROM (Lag with ext) 4th PIP = -3 (+13), 5th -10 (+5) degrees.     AROM Hand: Tip to palm/DPC digits 1-5 (in cm)  R 4th and 5th not intact : 4th = -0.5. (+1.5 cm) , 5th -0.5 (+0.5 cm),      Edema:              Circumferential (in cm) L R           MPs 20.3 20.8 (-0.0)              Strength (in pounds, psi's):   Left Right    60/65 19/28 (WNL per norms 49#)     TREATMENT  Barbra received the following supervised modalities: after being cleared for contradictions for 6 min with hand propped in max ext 3 min then max composite fist x 3 min for low load prolonged stretch.     Barbra received the following manual therapy techniques for 8 minutes minutes: retrograde/STM R hand.    Barbra received therapeutic exercises for 15 min includin min PROM ex with hand propped in max ext 3 min then max composite fist x 3 min for low load prolonged stretch and moist heat to facilitate.  Tenodesis pattern x25   Yellow digiciser 3x20  Red flex bar x20 wrist flex/ext and add/abd  Fingerlifts x25  Place and hold composite fist x 10  Tendon glides straight, wave, long to composite x20   Gentle PROM digit flexion and ext x10 each    Home Exercises and Education Provided     Education provided: Isotoner glove recommendation, nightime wear nomi    Written Home Exercises Provided:  Patient instructed to cont prior HEP and begin putty ex as tolerated.    Exercises were reviewed and Barbra was able to demonstrate them prior to the end of the session.  Barbra demonstrated good  understanding of the education provided.   .   See EMR under Media for exercises provided today and/or prior visit..        Assessment   Pt would continue to benefit from skilled OT. Pt with significantly limited R hand  strength per testing today. Some  decreased stiffness reported. Con per current poc.      - Progress towards goals: STG #1 has been met.    Barbra is progressing well towards her goals and there are no updates to goals at this time. Pt prognosis continues with good rehab potential.     Pt will continue to benefit from skilled outpatient occupational therapy to address the deficits listed in the problem list on initial evaluation in order to maximize pt's level of independence in the home and community.     Anticipated barriers to occupational therapy: None    Pt's spiritual, cultural and educational needs considered and pt agreeable to plan of care and goals.    Goals:  Short Term Goals: (to be met by 12/10/19, or 10th visit) unless otherwise noted below.  1. Pt will be independent with HEP in 2 visits. (Met)  2. Pt will report decreased pain to a 5/10 at worst in R hand with ADL/IADLs in order to increase function/use of UE.   3. Pt will increase AROM ext of 4th and 4thh PIP jts to neutral (0 degrees) to increase function for ADL/IADL.  4. Pt will increase AROM R 4th and 5th tip to palm to intact / 0 cm from DPC to increase function for ADL/IADL.  5  Pt will demo decreased edema R hand MCPS to WNL in order to speed recovery.     Long Term Goals: (by discharge)  1. Pt will report decreased pain to 1-2/10 with ADLs to allow for increased function/use of UE.   2. Pt will exhibit grossly WNL AROM of R hand to allow for Independent use of for all ADL/IADL tasks.  3. Pt will exhibit WFL  strength to allow a firm grasp during ADL/IADL (cooking utensils, tool use, carrying groceries, steering wheel, etc.)  4. Pt will report no difficulty with all Quick DASH assessment items.  5. Pt will return to PLOF with all ADL/IADL, leisure and job tasks.    Plan   Outpatient Occupational Therapy 3 times weekly through current poc expiration date 12/10/19, in order to to decrease pain and edema, and increase A/PROM, strength, and functional use of R upper  extremity.    Updates/Grading for next session: Progress with OT as tolerated      BEHZAD Salas, BROOKET

## 2019-11-20 ENCOUNTER — CLINICAL SUPPORT (OUTPATIENT)
Dept: REHABILITATION | Facility: HOSPITAL | Age: 69
End: 2019-11-20
Attending: ORTHOPAEDIC SURGERY
Payer: MEDICARE

## 2019-11-20 DIAGNOSIS — S62.344D CLOSED NONDISPLACED FRACTURE OF BASE OF FOURTH METACARPAL BONE OF RIGHT HAND WITH ROUTINE HEALING, SUBSEQUENT ENCOUNTER: ICD-10-CM

## 2019-11-20 DIAGNOSIS — M79.89 PAIN AND SWELLING OF RIGHT UPPER EXTREMITY: ICD-10-CM

## 2019-11-20 DIAGNOSIS — M25.641 STIFFNESS OF FINGER JOINT OF RIGHT HAND: Primary | ICD-10-CM

## 2019-11-20 DIAGNOSIS — M79.601 PAIN AND SWELLING OF RIGHT UPPER EXTREMITY: ICD-10-CM

## 2019-11-20 DIAGNOSIS — S62.336D CLOSED DISPLACED FRACTURE OF NECK OF FIFTH METACARPAL BONE OF RIGHT HAND WITH ROUTINE HEALING, SUBSEQUENT ENCOUNTER: ICD-10-CM

## 2019-11-20 PROCEDURE — 97110 THERAPEUTIC EXERCISES: CPT | Mod: PO

## 2019-11-20 PROCEDURE — 97140 MANUAL THERAPY 1/> REGIONS: CPT | Mod: PO

## 2019-11-20 NOTE — PROGRESS NOTES
Occupational Therapy Daily Treatment Note     Name: Barbra Chapman  Clinic Number: [id number]    Therapy Diagnosis:   Encounter Diagnoses   Name Primary?    Stiffness of finger joint of right hand Yes    Pain and swelling of right upper extremity     Closed nondisplaced fracture of base of fourth metacarpal bone of right hand with routine healing, subsequent encounter     Closed displaced fracture of neck of fifth metacarpal bone of right hand with routine healing, subsequent encounter      Physician: Rosendo Mehta MD    Physician orders:  Note     Please begin therapy to the right hand.  Include range of motion and strengthening activities     Frequency:  3 times per week     Duration:  4 weeks     Diagnosis:  Right 4th metacarpal base and 5th metacarpal neck fractures          Visit Date: 11/22/2019  Medical Diagnosis:   S62.336D (ICD-10-CM) - Closed displaced fracture of neck of fifth metacarpal bone of right hand with routine healing, subsequent encounter   S62.344D (ICD-10-CM) - Closed nondisplaced fracture of base of fourth metacarpal bone of right hand with routine healing, subsequent encounter          Evaluation Date: 11/12/2019  Insurance Authorization period Expiration   Cert end date: 2/10/2020  Plan of Care Expiration Period: 12/10/19  Next Re-assessment: by: In 4 weeks: 12/10/19 and/or 10th visit  Date of Return to MD: 12/9/19     Visit # / Visits Authortized: 5 / 12  Time In: 1400  Time Out: 1450    Total Billable Time: 25 minutes     Precautions: Standard and progress with strengthening  Surgery: N/A, closed tx with cast                                S/P: approx 8 weeks      Subjective     Pt reports: She has a lot of trouble getting hand moving each morning, but she does feel like she is progressing.  she was compliant with HEP.   Response to previous treatment: Good.  Functional change: increased endurance with R hand use    Pain:  Functional Pain Scale Rating 0-10:   3/10 on  average  0/10 at best  5/10 at worst  Location: R hand  Description: aches  Aggravating Factors: lift carry, attempts to make full fist and do push ups.   Easing Factors: rest/elevation.    Objective   LM AROM (Lag with ext) 4th PIP = -3 (+13), 5th -10 (+5) degrees.     AROM Hand: Tip to palm/DPC digits 1-5 all intact this date      Edema:              Circumferential (in cm) L R           MPs 20.3 20.6 (-0.2)              Strength (in pounds, psi's): from 19 LM   Left Right    60/65  (WNL per norms 49#)     LM = last measurement    TREATMENT  Barbra received the following supervised modalities: after being cleared for contradictions for 6 min with hand propped in max ext 3 min then max composite fist x 3 min for low load prolonged stretch.     Barbra received the following manual therapy techniques for 8 minutes minutes: retrograde/STM R hand.    Barbra received therapeutic exercises for 15 min includin min PROM ex with hand propped in max ext 3 min then max composite fist x 3 min for low load prolonged stretch and moist heat to facilitate.  Tenodesis pattern x25   Red flex bar x30 wrist flex/ext and add/abd  Fingerlifts x25  Place and hold composite hook fist x 10  Gentle PROM digit flexion and ext x10 each    Home Exercises and Education Provided     Education provided: Cont with compression glove; nightime wear nomi    Written Home Exercises Provided:  Patient instructed to cont prior HEP and begin putty ex as tolerated.    Exercises were reviewed and Barbra was able to demonstrate them prior to the end of the session.  Barbra demonstrated good  understanding of the education provided.   .   See EMR under Media for exercises provided today and/or prior visit..        Assessment   Pt would continue to benefit from skilled OT. Pt with much improved AROM R hand with tip to palm AROM intact all digits. Decreased edema also of 0.2 cm. Pt has obtained a compression glove for her R hand and is  tolerating wear well.     - Progress towards goals: STG #1 has been met.    Barbra is progressing well towards her goals and there are no updates to goals at this time. Pt prognosis continues with good rehab potential.     Pt will continue to benefit from skilled outpatient occupational therapy to address the deficits listed in the problem list on initial evaluation in order to maximize pt's level of independence in the home and community.     Anticipated barriers to occupational therapy: None    Pt's spiritual, cultural and educational needs considered and pt agreeable to plan of care and goals.    Goals:  Short Term Goals: (to be met by 12/10/19, or 10th visit) unless otherwise noted below.  1. Pt will be independent with HEP in 2 visits. (Met)  2. Pt will report decreased pain to a 5/10 at worst in R hand with ADL/IADLs in order to increase function/use of UE.   3. Pt will increase AROM ext of 4th and 4thh PIP jts to neutral (0 degrees) to increase function for ADL/IADL.  4. Pt will increase AROM R 4th and 5th tip to palm to intact / 0 cm from DPC to increase function for ADL/IADL.  5  Pt will demo decreased edema R hand MCPS to WNL in order to speed recovery.     Long Term Goals: (by discharge)  1. Pt will report decreased pain to 1-2/10 with ADLs to allow for increased function/use of UE.   2. Pt will exhibit grossly WNL AROM of R hand to allow for Independent use of for all ADL/IADL tasks.  3. Pt will exhibit WFL  strength to allow a firm grasp during ADL/IADL (cooking utensils, tool use, carrying groceries, steering wheel, etc.)  4. Pt will report no difficulty with all Quick DASH assessment items.  5. Pt will return to PLOF with all ADL/IADL, leisure and job tasks.    Plan   Outpatient Occupational Therapy 3 times weekly through current poc expiration date 12/10/19, in order to to decrease pain and edema, and increase A/PROM, strength, and functional use of R upper extremity.    Updates/Grading for next  session: Progress with OT as tolerated      BEHZAD Salas, BROOKET

## 2019-11-22 ENCOUNTER — CLINICAL SUPPORT (OUTPATIENT)
Dept: REHABILITATION | Facility: HOSPITAL | Age: 69
End: 2019-11-22
Attending: ORTHOPAEDIC SURGERY
Payer: MEDICARE

## 2019-11-22 DIAGNOSIS — M79.89 PAIN AND SWELLING OF RIGHT UPPER EXTREMITY: ICD-10-CM

## 2019-11-22 DIAGNOSIS — S62.344D CLOSED NONDISPLACED FRACTURE OF BASE OF FOURTH METACARPAL BONE OF RIGHT HAND WITH ROUTINE HEALING, SUBSEQUENT ENCOUNTER: ICD-10-CM

## 2019-11-22 DIAGNOSIS — M79.601 PAIN AND SWELLING OF RIGHT UPPER EXTREMITY: ICD-10-CM

## 2019-11-22 DIAGNOSIS — S62.336D CLOSED DISPLACED FRACTURE OF NECK OF FIFTH METACARPAL BONE OF RIGHT HAND WITH ROUTINE HEALING, SUBSEQUENT ENCOUNTER: ICD-10-CM

## 2019-11-22 DIAGNOSIS — M25.641 STIFFNESS OF FINGER JOINT OF RIGHT HAND: Primary | ICD-10-CM

## 2019-11-22 PROCEDURE — 97110 THERAPEUTIC EXERCISES: CPT | Mod: PO

## 2019-11-22 PROCEDURE — 97140 MANUAL THERAPY 1/> REGIONS: CPT | Mod: PO

## 2019-11-22 NOTE — PROGRESS NOTES
Occupational Therapy Daily Treatment Note     Name: Barbra Chapman  Clinic Number: [id number]    Therapy Diagnosis:   Encounter Diagnoses   Name Primary?    Stiffness of finger joint of right hand Yes    Pain and swelling of right upper extremity     Closed nondisplaced fracture of base of fourth metacarpal bone of right hand with routine healing, subsequent encounter     Closed displaced fracture of neck of fifth metacarpal bone of right hand with routine healing, subsequent encounter      Physician: Rosendo Mehta MD    Physician orders:  Note     Please begin therapy to the right hand. Include range of motion and strengthening activities     Frequency:  3 times per week     Duration:  4 weeks     Diagnosis:  Right 4th metacarpal base and 5th metacarpal neck fractures          Visit Date: 11/25/2019  Medical Diagnosis:   S62.336D (ICD-10-CM) - Closed displaced fracture of neck of fifth metacarpal bone of right hand with routine healing, subsequent encounter   S62.344D (ICD-10-CM) - Closed nondisplaced fracture of base of fourth metacarpal bone of right hand with routine healing, subsequent encounter          Evaluation Date: 11/12/2019  Insurance Authorization period Expiration   Cert end date: 2/10/2020  Plan of Care Expiration Period: 12/10/19  Next Re-assessment: by: In 4 weeks: 12/10/19 and/or 10th visit  Date of Return to MD: 12/9/19     Visit # / Visits Authortized: 6 / 12  Time In: 1301  Time Out: 1355    Total Billable Time: 50 minutes     Precautions: Standard and progress with strengthening  Surgery: N/A, closed tx with cast                                S/P: approx 8 weeks      Subjective     Pt reports: She has a lot of trouble getting hand moving each morning, but she does feel like she is progressing.  she was compliant with HEP.   Response to previous treatment: Good.  Functional change: increased endurance with R hand use    Pain:  Functional Pain Scale Rating 0-10:   3/10 on  average  0/10 at best  5/10 at worst  Location: R hand  Description: aches  Aggravating Factors: lift carry, attempts to make full fist and do push ups.   Easing Factors: rest/elevation.    Objective   LM AROM (Lag with ext) 4th PIP = -3 (+13), 5th -10 (+5) degrees.     AROM Hand: Tip to palm/DPC digits 1-5 all intact this date      Edema:              Circumferential (in cm) L R           MPs 20.3 20.6 (-0.2)              Strength (in pounds, psi's): from 11/20/19 LM   Left Right    60/65 19/28 (WNL per norms 49#)     LM = last measurement    TREATMENT  Barbra received the following supervised modalities: after being cleared for contradictions: Fluidotherapy x 15 min at 115 degrees: R hand with tendon glides and composite fist with 2 min hold.    Barbra received the following manual therapy techniques for 8 minutes minutes: retrograde/STM R hand.    Barbra received therapeutic exercises for 15 min including:  Tenodesis pattern x25   Red flex bar x30 wrist flex/ext, add/abd and pronation/supination.  Red clothespin lateral pinches x 50 and tripod pinches x50.  Fingerlifts x25  Place and hold composite hook fist x 10  Gentle PROM digit flexion and ext x10 each    Home Exercises and Education Provided     Education provided: None new    Written Home Exercises Provided: None new    Exercises were reviewed and Barbra was able to demonstrate them prior to the end of the session.  Barbra demonstrated good  understanding of the education provided.   .   See EMR under Media for exercises provided today and/or prior visit..        Assessment   Pt would continue to benefit from skilled OT.        - Progress towards goals: STG #1 has been met.    Barbra is progressing well towards her goals and there are no updates to goals at this time. Pt prognosis continues with good rehab potential.     Pt will continue to benefit from skilled outpatient occupational therapy to address the deficits listed in the problem list on  initial evaluation in order to maximize pt's level of independence in the home and community.     Anticipated barriers to occupational therapy: None    Pt's spiritual, cultural and educational needs considered and pt agreeable to plan of care and goals.    Goals:  Short Term Goals: (to be met by 12/10/19, or 10th visit) unless otherwise noted below.  1. Pt will be independent with HEP in 2 visits. (Met)  2. Pt will report decreased pain to a 5/10 at worst in R hand with ADL/IADLs in order to increase function/use of UE.   3. Pt will increase AROM ext of 4th and 4thh PIP jts to neutral (0 degrees) to increase function for ADL/IADL.  4. Pt will increase AROM R 4th and 5th tip to palm to intact / 0 cm from DPC to increase function for ADL/IADL.  5  Pt will demo decreased edema R hand MCPS to WNL in order to speed recovery.     Long Term Goals: (by discharge)  1. Pt will report decreased pain to 1-2/10 with ADLs to allow for increased function/use of UE.   2. Pt will exhibit grossly WNL AROM of R hand to allow for Independent use of for all ADL/IADL tasks.  3. Pt will exhibit WFL  strength to allow a firm grasp during ADL/IADL (cooking utensils, tool use, carrying groceries, steering wheel, etc.)  4. Pt will report no difficulty with all Quick DASH assessment items.  5. Pt will return to PLOF with all ADL/IADL, leisure and job tasks.    Plan   Outpatient Occupational Therapy 3 times weekly through current poc expiration date 12/10/19, in order to to decrease pain and edema, and increase A/PROM, strength, and functional use of R upper extremity.    Updates/Grading for next session: Progress with OT as tolerated      BEHZAD Salas, BROOKET

## 2019-11-25 ENCOUNTER — CLINICAL SUPPORT (OUTPATIENT)
Dept: REHABILITATION | Facility: HOSPITAL | Age: 69
End: 2019-11-25
Attending: ORTHOPAEDIC SURGERY
Payer: MEDICARE

## 2019-11-25 DIAGNOSIS — M25.641 STIFFNESS OF FINGER JOINT OF RIGHT HAND: Primary | ICD-10-CM

## 2019-11-25 DIAGNOSIS — M79.601 PAIN AND SWELLING OF RIGHT UPPER EXTREMITY: ICD-10-CM

## 2019-11-25 DIAGNOSIS — M79.89 PAIN AND SWELLING OF RIGHT UPPER EXTREMITY: ICD-10-CM

## 2019-11-25 DIAGNOSIS — S62.336D CLOSED DISPLACED FRACTURE OF NECK OF FIFTH METACARPAL BONE OF RIGHT HAND WITH ROUTINE HEALING, SUBSEQUENT ENCOUNTER: ICD-10-CM

## 2019-11-25 DIAGNOSIS — S62.344D CLOSED NONDISPLACED FRACTURE OF BASE OF FOURTH METACARPAL BONE OF RIGHT HAND WITH ROUTINE HEALING, SUBSEQUENT ENCOUNTER: ICD-10-CM

## 2019-11-25 PROCEDURE — 97022 WHIRLPOOL THERAPY: CPT | Mod: PO

## 2019-11-25 PROCEDURE — 97110 THERAPEUTIC EXERCISES: CPT | Mod: PO

## 2019-11-25 PROCEDURE — 97140 MANUAL THERAPY 1/> REGIONS: CPT | Mod: PO

## 2019-11-26 NOTE — PROGRESS NOTES
"  Occupational Therapy Daily Treatment Note     Name: Barbra Chapman  Clinic Number: [id number]    Therapy Diagnosis:   Encounter Diagnoses   Name Primary?    Stiffness of finger joint of right hand Yes    Pain and swelling of right upper extremity     Closed nondisplaced fracture of base of fourth metacarpal bone of right hand with routine healing, subsequent encounter     Closed displaced fracture of neck of fifth metacarpal bone of right hand with routine healing, subsequent encounter      Physician: Rosendo Mehta MD    Physician orders:  Note     Please begin therapy to the right hand. Include range of motion and strengthening activities     Frequency:  3 times per week     Duration:  4 weeks     Diagnosis:  Right 4th metacarpal base and 5th metacarpal neck fractures          Visit Date: 11/27/2019  Medical Diagnosis:   S62.336D (ICD-10-CM) - Closed displaced fracture of neck of fifth metacarpal bone of right hand with routine healing, subsequent encounter   S62.344D (ICD-10-CM) - Closed nondisplaced fracture of base of fourth metacarpal bone of right hand with routine healing, subsequent encounter          Evaluation Date: 11/12/2019  Insurance Authorization period Expiration   Cert end date: 2/10/2020  Plan of Care Expiration Period: 12/10/19  Next Re-assessment: by: In 4 weeks: 12/10/19 and/or 10th visit  Date of Return to MD: 12/9/19     Visit # / Visits Authortized: 7 / 12  Time In: 1301   Time Out: 1350    Total Billable Time: 40 minutes     Precautions: Standard and progress with strengthening  Surgery: N/A, closed tx with cast                                S/P: approx 8 weeks      Subjective     Pt reports: She feels she is improving but slowly  she was compliant with HEP.   Response to previous treatment: Good.  Functional change: Able to tolerate her regular "regulation" push-ups  Pain:  Functional Pain Scale Rating 0-10:   3/10 on average  0/10 at best  5/10 at worst  Location: R " hand  Description: aches  Aggravating Factors: lift carry, attempts to make full fist and do push ups.   Easing Factors: rest/elevation.    Objective   LM AROM (Lag with ext) 4th PIP = -3 (+13), 5th -10 (+5) degrees.     AROM Hand: Tip to palm/DPC digits 1-5 all intact    Edema:              Circumferential (in cm) L R           MPs 20.3 20.5 (-0.1)              Strength (in pounds, psi's):    Left Right    60/65 25/35 (+7) (WNL per norms 49#)     LM = last measurement    TREATMENT  Barbra received the following supervised modalities: moist heat x 5 min:  3 min in composite ext and 3 min on hook fist.    Barbra received the following manual therapy techniques for 8 minutes minutes: retrograde/STM R hand.    Barbra received therapeutic exercises for 15 min including:  Tenodesis pattern x25   Red flex bar x30 wrist flex/ext, add/abd and pronation/supination.  Fingerlifts x25  Place and hold composite hook fist x 10  Gentle PROM digit flexion and ext x10 each    Home Exercises and Education Provided     Education provided: None new    Written Home Exercises Provided: None new    Exercises were reviewed and Barbra was able to demonstrate them prior to the end of the session.  Barbra demonstrated good  understanding of the education provided.   .   See EMR under Media for exercises provided today and/or prior visit..        Assessment   Pt would continue to benefit from skilled OT. Pt with decreased edema R hand and much improved R and strength to 35# max. To cont to progress per current Poc.     - Progress towards goals: STG #1 has been met.    Barbra is progressing well towards her goals and there are no updates to goals at this time. Pt prognosis continues with good rehab potential.     Pt will continue to benefit from skilled outpatient occupational therapy to address the deficits listed in the problem list on initial evaluation in order to maximize pt's level of independence in the home and community.      Anticipated barriers to occupational therapy: None    Pt's spiritual, cultural and educational needs considered and pt agreeable to plan of care and goals.    Goals:  Short Term Goals: (to be met by 12/10/19, or 10th visit) unless otherwise noted below.  1. Pt will be independent with HEP in 2 visits. (Met)  2. Pt will report decreased pain to a 5/10 at worst in R hand with ADL/IADLs in order to increase function/use of UE.   3. Pt will increase AROM ext of 4th and 4thh PIP jts to neutral (0 degrees) to increase function for ADL/IADL.  4. Pt will increase AROM R 4th and 5th tip to palm to intact / 0 cm from DPC to increase function for ADL/IADL.  5  Pt will demo decreased edema R hand MCPS to WNL in order to speed recovery.     Long Term Goals: (by discharge)  1. Pt will report decreased pain to 1-2/10 with ADLs to allow for increased function/use of UE.   2. Pt will exhibit grossly WNL AROM of R hand to allow for Independent use of for all ADL/IADL tasks.  3. Pt will exhibit WFL  strength to allow a firm grasp during ADL/IADL (cooking utensils, tool use, carrying groceries, steering wheel, etc.)  4. Pt will report no difficulty with all Quick DASH assessment items.  5. Pt will return to PLOF with all ADL/IADL, leisure and job tasks.    Plan   Outpatient Occupational Therapy 3 times weekly through current poc expiration date 12/10/19, in order to to decrease pain and edema, and increase A/PROM, strength, and functional use of R upper extremity.    Updates/Grading for next session: Progress with OT as tolerated      BEHZAD Salas, BROOKET

## 2019-11-27 ENCOUNTER — CLINICAL SUPPORT (OUTPATIENT)
Dept: REHABILITATION | Facility: HOSPITAL | Age: 69
End: 2019-11-27
Payer: MEDICARE

## 2019-11-27 DIAGNOSIS — M79.89 PAIN AND SWELLING OF RIGHT UPPER EXTREMITY: ICD-10-CM

## 2019-11-27 DIAGNOSIS — S62.336D CLOSED DISPLACED FRACTURE OF NECK OF FIFTH METACARPAL BONE OF RIGHT HAND WITH ROUTINE HEALING, SUBSEQUENT ENCOUNTER: ICD-10-CM

## 2019-11-27 DIAGNOSIS — S62.344D CLOSED NONDISPLACED FRACTURE OF BASE OF FOURTH METACARPAL BONE OF RIGHT HAND WITH ROUTINE HEALING, SUBSEQUENT ENCOUNTER: ICD-10-CM

## 2019-11-27 DIAGNOSIS — M79.601 PAIN AND SWELLING OF RIGHT UPPER EXTREMITY: ICD-10-CM

## 2019-11-27 DIAGNOSIS — M25.641 STIFFNESS OF FINGER JOINT OF RIGHT HAND: Primary | ICD-10-CM

## 2019-11-27 PROCEDURE — 97140 MANUAL THERAPY 1/> REGIONS: CPT | Mod: PO

## 2019-11-27 PROCEDURE — 97110 THERAPEUTIC EXERCISES: CPT | Mod: PO

## 2019-11-27 NOTE — PROGRESS NOTES
Occupational Therapy Daily Treatment Note     Name: Barbra Chapman  Clinic Number: [id number]    Therapy Diagnosis:   Encounter Diagnoses   Name Primary?    Stiffness of finger joint of right hand Yes    Pain and swelling of right upper extremity     Closed nondisplaced fracture of base of fourth metacarpal bone of right hand with routine healing, subsequent encounter     Closed displaced fracture of neck of fifth metacarpal bone of right hand with routine healing, subsequent encounter      Physician: Rosendo Mehta MD    Physician orders:  Note     Please begin therapy to the right hand. Include range of motion and strengthening activities     Frequency:  3 times per week     Duration:  4 weeks     Diagnosis:  Right 4th metacarpal base and 5th metacarpal neck fractures          Visit Date: 12/2/2019  Medical Diagnosis:   S62.336D (ICD-10-CM) - Closed displaced fracture of neck of fifth metacarpal bone of right hand with routine healing, subsequent encounter   S62.344D (ICD-10-CM) - Closed nondisplaced fracture of base of fourth metacarpal bone of right hand with routine healing, subsequent encounter          Evaluation Date: 11/12/2019  Insurance Authorization period Expiration   Cert end date: 2/10/2020  Plan of Care Expiration Period: 12/10/19  Next Re-assessment: by: In 4 weeks: 12/10/19 and/or 10th visit  Date of Return to MD: 12/9/19     Visit # / Visits Authortized: 8 / 12  Time In: 1245  Time Out: 1345    Total Billable Time: 60 inutes     Precautions: Standard and progress with strengthening  Surgery: N/A, closed tx with cast                                S/P: approx 8 weeks      Subjective     Pt reports: Pt reports she had been able to handle pots and pans.  she was compliant with HEP.     Response to previous treatment: Good.    Functional change: able to cook/clean using R hand.    Pain:  Functional Pain Scale Rating 0-10:   3/10 on average  0/10 at best  5/10 at worst  Location: R  hand  Description: aches  Aggravating Factors: lift carry, attempts to make full fist and do push ups.   Easing Factors: rest/elevation.    Objective     Measurements:    LM AROM (Lag with ext) 4th PIP = -3 (+13), 5th -10 (+5) degrees.     AROM Hand: Tip to palm/DPC digits 1-5 all intact LM (11/25/19)    Edema:              Circumferential (in cm) L R           MPs 20.3   LM 20.5 (-0.1)              Strength (in pounds, psi's):    Left Right    60/65 35/36 (+1) (WNL per norms 49#)     LM = last measurement    TREATMENT  Barbra received the following supervised modalities: Fluidotherapy x 15 min.    Barbra received the following manual therapy techniques for 8 minutes minutes: retrograde/STM R hand.    Barbra received therapeutic activities for 30 min including:  isogrips green flex bar 3x10  Pinches red clothespin: lateral and tripod 5x10 each  PHG gold spring level 4 5x5  Thumbciser 5x10  Green flexbar palm down 3x10  Drags R hand x25    Home Exercises and Education Provided     Education provided: None new    Written Home Exercises Provided: None new    Exercises were reviewed and Barbra was able to demonstrate them prior to the end of the session.  Barbra demonstrated good  understanding of the education provided.   .   See EMR under Media for exercises provided today and/or prior visit..        Assessment   Pt would continue to benefit from skilled OT. Pt with continued improvement with  strength; responding well to tx. Cont per poc.     - Progress towards goals: STG #1 has been met.    Barbra is progressing well towards her goals and there are no updates to goals at this time. Pt prognosis continues with good rehab potential.     Pt will continue to benefit from skilled outpatient occupational therapy to address the deficits listed in the problem list on initial evaluation in order to maximize pt's level of independence in the home and community.     Anticipated barriers to occupational therapy:  None    Pt's spiritual, cultural and educational needs considered and pt agreeable to plan of care and goals.    Goals:  Short Term Goals: (to be met by 12/10/19, or 10th visit) unless otherwise noted below.  1. Pt will be independent with HEP in 2 visits. (Met)  2. Pt will report decreased pain to a 5/10 at worst in R hand with ADL/IADLs in order to increase function/use of UE.   3. Pt will increase AROM ext of 4th and 4thh PIP jts to neutral (0 degrees) to increase function for ADL/IADL.  4. Pt will increase AROM R 4th and 5th tip to palm to intact / 0 cm from DPC to increase function for ADL/IADL.  5  Pt will demo decreased edema R hand MCPS to WNL in order to speed recovery.     Long Term Goals: (by discharge)  1. Pt will report decreased pain to 1-2/10 with ADLs to allow for increased function/use of UE.   2. Pt will exhibit grossly WNL AROM of R hand to allow for Independent use of for all ADL/IADL tasks.  3. Pt will exhibit WFL  strength to allow a firm grasp during ADL/IADL (cooking utensils, tool use, carrying groceries, steering wheel, etc.)  4. Pt will report no difficulty with all Quick DASH assessment items.  5. Pt will return to PLOF with all ADL/IADL, leisure and job tasks.    Plan   Outpatient Occupational Therapy 3 times weekly through current poc expiration date 12/10/19, in order to to decrease pain and edema, and increase A/PROM, strength, and functional use of R upper extremity.    Updates/Grading for next session: Progress with OT as tolerated      BEHZAD Salas, PARUL

## 2019-12-02 ENCOUNTER — CLINICAL SUPPORT (OUTPATIENT)
Dept: REHABILITATION | Facility: HOSPITAL | Age: 69
End: 2019-12-02
Attending: ORTHOPAEDIC SURGERY
Payer: MEDICARE

## 2019-12-02 DIAGNOSIS — M79.601 PAIN AND SWELLING OF RIGHT UPPER EXTREMITY: ICD-10-CM

## 2019-12-02 DIAGNOSIS — M25.641 STIFFNESS OF FINGER JOINT OF RIGHT HAND: Primary | ICD-10-CM

## 2019-12-02 DIAGNOSIS — S62.336D CLOSED DISPLACED FRACTURE OF NECK OF FIFTH METACARPAL BONE OF RIGHT HAND WITH ROUTINE HEALING, SUBSEQUENT ENCOUNTER: ICD-10-CM

## 2019-12-02 DIAGNOSIS — S62.344D CLOSED NONDISPLACED FRACTURE OF BASE OF FOURTH METACARPAL BONE OF RIGHT HAND WITH ROUTINE HEALING, SUBSEQUENT ENCOUNTER: ICD-10-CM

## 2019-12-02 DIAGNOSIS — M79.89 PAIN AND SWELLING OF RIGHT UPPER EXTREMITY: ICD-10-CM

## 2019-12-02 PROCEDURE — 97022 WHIRLPOOL THERAPY: CPT | Mod: PO

## 2019-12-02 PROCEDURE — 97140 MANUAL THERAPY 1/> REGIONS: CPT | Mod: PO

## 2019-12-02 PROCEDURE — 97530 THERAPEUTIC ACTIVITIES: CPT | Mod: PO

## 2019-12-09 ENCOUNTER — OFFICE VISIT (OUTPATIENT)
Dept: ORTHOPEDICS | Facility: CLINIC | Age: 69
End: 2019-12-09
Payer: MEDICARE

## 2019-12-09 VITALS
BODY MASS INDEX: 21.22 KG/M2 | DIASTOLIC BLOOD PRESSURE: 82 MMHG | SYSTOLIC BLOOD PRESSURE: 141 MMHG | WEIGHT: 140 LBS | HEART RATE: 69 BPM | HEIGHT: 68 IN

## 2019-12-09 DIAGNOSIS — S62.336D CLOSED DISPLACED FRACTURE OF NECK OF FIFTH METACARPAL BONE OF RIGHT HAND WITH ROUTINE HEALING, SUBSEQUENT ENCOUNTER: Primary | ICD-10-CM

## 2019-12-09 DIAGNOSIS — S62.344D CLOSED NONDISPLACED FRACTURE OF BASE OF FOURTH METACARPAL BONE OF RIGHT HAND WITH ROUTINE HEALING, SUBSEQUENT ENCOUNTER: ICD-10-CM

## 2019-12-09 PROCEDURE — 99213 OFFICE O/P EST LOW 20 MIN: CPT | Mod: PBBFAC,PN | Performed by: ORTHOPAEDIC SURGERY

## 2019-12-09 PROCEDURE — 99213 PR OFFICE/OUTPT VISIT, EST, LEVL III, 20-29 MIN: ICD-10-PCS | Mod: S$PBB,,, | Performed by: ORTHOPAEDIC SURGERY

## 2019-12-09 PROCEDURE — 1126F PR PAIN SEVERITY QUANTIFIED, NO PAIN PRESENT: ICD-10-PCS | Mod: ,,, | Performed by: ORTHOPAEDIC SURGERY

## 2019-12-09 PROCEDURE — 99213 OFFICE O/P EST LOW 20 MIN: CPT | Mod: S$PBB,,, | Performed by: ORTHOPAEDIC SURGERY

## 2019-12-09 PROCEDURE — 1159F MED LIST DOCD IN RCRD: CPT | Mod: ,,, | Performed by: ORTHOPAEDIC SURGERY

## 2019-12-09 PROCEDURE — 1159F PR MEDICATION LIST DOCUMENTED IN MEDICAL RECORD: ICD-10-PCS | Mod: ,,, | Performed by: ORTHOPAEDIC SURGERY

## 2019-12-09 PROCEDURE — 99999 PR PBB SHADOW E&M-EST. PATIENT-LVL III: CPT | Mod: PBBFAC,,, | Performed by: ORTHOPAEDIC SURGERY

## 2019-12-09 PROCEDURE — 1126F AMNT PAIN NOTED NONE PRSNT: CPT | Mod: ,,, | Performed by: ORTHOPAEDIC SURGERY

## 2019-12-09 PROCEDURE — 99999 PR PBB SHADOW E&M-EST. PATIENT-LVL III: ICD-10-PCS | Mod: PBBFAC,,, | Performed by: ORTHOPAEDIC SURGERY

## 2019-12-09 RX ORDER — AMOXICILLIN 875 MG/1
TABLET, FILM COATED ORAL
Refills: 0 | COMMUNITY
Start: 2019-12-05

## 2019-12-11 ENCOUNTER — TELEPHONE (OUTPATIENT)
Dept: REHABILITATION | Facility: HOSPITAL | Age: 69
End: 2019-12-11
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
## 2019-12-18 ENCOUNTER — DOCUMENTATION ONLY (OUTPATIENT)
Dept: REHABILITATION | Facility: HOSPITAL | Age: 69
End: 2019-12-18

## 2019-12-18 DIAGNOSIS — M25.641 STIFFNESS OF FINGER JOINT OF RIGHT HAND: Primary | ICD-10-CM

## 2019-12-18 DIAGNOSIS — M79.601 PAIN AND SWELLING OF RIGHT UPPER EXTREMITY: ICD-10-CM

## 2019-12-18 DIAGNOSIS — S62.344D CLOSED NONDISPLACED FRACTURE OF BASE OF FOURTH METACARPAL BONE OF RIGHT HAND WITH ROUTINE HEALING, SUBSEQUENT ENCOUNTER: ICD-10-CM

## 2019-12-18 DIAGNOSIS — S62.336D CLOSED DISPLACED FRACTURE OF NECK OF FIFTH METACARPAL BONE OF RIGHT HAND WITH ROUTINE HEALING, SUBSEQUENT ENCOUNTER: ICD-10-CM

## 2019-12-18 DIAGNOSIS — M79.89 PAIN AND SWELLING OF RIGHT UPPER EXTREMITY: ICD-10-CM

## 2019-12-18 PROBLEM — S62.344A CLOSED NONDISPLACED FRACTURE OF BASE OF FOURTH METACARPAL BONE OF RIGHT HAND: Status: RESOLVED | Noted: 2019-10-14 | Resolved: 2019-12-18

## 2019-12-18 PROBLEM — S62.336A CLOSED DISPLACED FRACTURE OF NECK OF RIGHT FIFTH METACARPAL BONE: Status: RESOLVED | Noted: 2019-10-14 | Resolved: 2019-12-18

## 2019-12-18 NOTE — PROGRESS NOTES
Pt was evaluated on 1/12/19 and was seen a total of 8 times for OT. Pt has not attended OT since 12/02/19. Pt reports she is pleased with outcome and is ready for D/C and will cont with HEP only at this time.HEP. Pt to be discharged at this time.

## 2021-07-01 ENCOUNTER — PATIENT MESSAGE (OUTPATIENT)
Dept: ADMINISTRATIVE | Facility: OTHER | Age: 71
End: 2021-07-01